# Patient Record
Sex: FEMALE | Race: OTHER
[De-identification: names, ages, dates, MRNs, and addresses within clinical notes are randomized per-mention and may not be internally consistent; named-entity substitution may affect disease eponyms.]

---

## 2019-08-26 ENCOUNTER — HOSPITAL ENCOUNTER (INPATIENT)
Dept: HOSPITAL 54 - GPS | Age: 64
LOS: 3 days | Discharge: LEFT BEFORE DISCHARGE | DRG: 885 | End: 2019-08-29
Attending: INTERNAL MEDICINE | Admitting: PSYCHIATRY & NEUROLOGY
Payer: MEDICARE

## 2019-08-26 VITALS — BODY MASS INDEX: 19.99 KG/M2 | HEIGHT: 65 IN | WEIGHT: 120 LBS

## 2019-08-26 DIAGNOSIS — I10: ICD-10-CM

## 2019-08-26 DIAGNOSIS — E83.51: ICD-10-CM

## 2019-08-26 DIAGNOSIS — M81.0: ICD-10-CM

## 2019-08-26 DIAGNOSIS — T14.91XD: ICD-10-CM

## 2019-08-26 DIAGNOSIS — E04.2: ICD-10-CM

## 2019-08-26 DIAGNOSIS — F29: ICD-10-CM

## 2019-08-26 DIAGNOSIS — F33.2: Primary | ICD-10-CM

## 2019-08-26 NOTE — NUR
ADMITTED THIS 64 YEARS OLD FEMALE FROM E.R PATIENT WAS PLACED ON 5150 HOLD  DUE TO  OVER 
DOSE OF NAR[place], PSYCHOSIS,NOS, UPON FACE TO FACE ASSESSMENT PATIENT DENIES ANY SI/HI AT THIS 
TIME, PATIENT IS UNDER CARE OF DR. MORFIN, AND DUNIA CATALAN. PATIENT SIGN THE CONSENT PAPER 
ADVISEMENT EXPLAIN AND SERVE TO THE PATIENT WITH HOSPITAL POLICY  BOOK AND MEDICATION BOOK. 
BODY ASSESSMENT IS DONE, SKIN IS INTACT AND PATENT NO OPEN WOUND OR BRUISES NOTED, NO 
BEHAVIOUR PROBLEM NOTED AT THIS TIME ALL NEEDS MET WILL CONTINUES TO MONITOR THE PATIENT 
EVERY 15 MINS  FOR SAFETY AND FALL AND ENDORSE TO THE ONCOMING NURSE TO CONTINUES TO MONITOR 
THE PATIENT

## 2019-08-27 VITALS — DIASTOLIC BLOOD PRESSURE: 59 MMHG | SYSTOLIC BLOOD PRESSURE: 92 MMHG

## 2019-08-27 VITALS — SYSTOLIC BLOOD PRESSURE: 100 MMHG | DIASTOLIC BLOOD PRESSURE: 60 MMHG

## 2019-08-27 VITALS — DIASTOLIC BLOOD PRESSURE: 72 MMHG | SYSTOLIC BLOOD PRESSURE: 144 MMHG

## 2019-08-27 LAB
ALBUMIN SERPL BCP-MCNC: 3.7 G/DL (ref 3.4–5)
ALP SERPL-CCNC: 37 U/L (ref 46–116)
ALT SERPL W P-5'-P-CCNC: 20 U/L (ref 12–78)
AST SERPL W P-5'-P-CCNC: 19 U/L (ref 15–37)
BILIRUB SERPL-MCNC: 0.9 MG/DL (ref 0.2–1)
BUN SERPL-MCNC: 13 MG/DL (ref 7–18)
CALCIUM SERPL-MCNC: 8.5 MG/DL (ref 8.5–10.1)
CHLORIDE SERPL-SCNC: 105 MMOL/L (ref 98–107)
CO2 SERPL-SCNC: 27 MMOL/L (ref 21–32)
CREAT SERPL-MCNC: 0.8 MG/DL (ref 0.6–1.3)
GLUCOSE SERPL-MCNC: 92 MG/DL (ref 74–106)
POTASSIUM SERPL-SCNC: 4.5 MMOL/L (ref 3.5–5.1)
PROT SERPL-MCNC: 6.7 G/DL (ref 6.4–8.2)
SODIUM SERPL-SCNC: 140 MMOL/L (ref 136–145)
TSH SERPL DL<=0.005 MIU/L-ACNC: 2.3 UIU/ML (ref 0.36–3.74)

## 2019-08-27 RX ADMIN — VENLAFAXINE HYDROCHLORIDE SCH MG: 37.5 CAPSULE, EXTENDED RELEASE ORAL at 16:00

## 2019-08-27 RX ADMIN — LATANOPROST SCH ML: 50 SOLUTION OPHTHALMIC at 20:54

## 2019-08-27 NOTE — NUR
RN NOTE

CALL MADE TO RELAY RESULT OF US THYROID O ,TOLD WILL LOOK AT THE RESULT.OK TO 
ORDER LATANOPROST EYE DROPS.PATIENT MADE AWARE.

## 2019-08-27 NOTE — NUR
SW called Quin Maldonado ([phone]), the pts , and left a voicemail stating that 
the SW would like to discuss the pts treatment plan and initial discharge plan.

## 2019-08-28 VITALS — DIASTOLIC BLOOD PRESSURE: 69 MMHG | SYSTOLIC BLOOD PRESSURE: 113 MMHG

## 2019-08-28 VITALS — SYSTOLIC BLOOD PRESSURE: 101 MMHG | DIASTOLIC BLOOD PRESSURE: 64 MMHG

## 2019-08-28 VITALS — DIASTOLIC BLOOD PRESSURE: 65 MMHG | SYSTOLIC BLOOD PRESSURE: 100 MMHG

## 2019-08-28 LAB
CHOLEST SERPL-MCNC: 213 MG/DL (ref ?–200)
HDLC SERPL-MCNC: 48 MG/DL (ref 40–60)
LDLC SERPL DIRECT ASSAY-MCNC: 147 MG/DL (ref 0–99)
PTH-INTACT SERPL-MCNC: 25 PG/ML (ref 15–65)
TRIGL SERPL-MCNC: 81 MG/DL (ref 30–150)

## 2019-08-28 RX ADMIN — LATANOPROST SCH ML: 50 SOLUTION OPHTHALMIC at 21:29

## 2019-08-28 RX ADMIN — VENLAFAXINE HYDROCHLORIDE SCH MG: 37.5 CAPSULE, EXTENDED RELEASE ORAL at 10:52

## 2019-08-28 RX ADMIN — CITALOPRAM SCH MG: 20 TABLET ORAL at 12:15

## 2019-08-28 NOTE — NUR
LUC called Quin Maldonado ([phone]), the pts , and left her a voicemail that 
informed her that the pt will be discharged tomorrow and that she will get picked up by her 
son.

## 2019-08-28 NOTE — NUR
SW returned the voicemail of Quin Maldonado ([phone]), the pts , and discussed 
the pts discharge plan. SW discussed the pt returning home with psychiatrist and outpatient 
aftercare referrals. It was discussed that the pts son will not be involved in the treatment 
planning due to the pt not wanting to stress him out due to personal reasons.

## 2019-08-28 NOTE — NUR
Initial Discharge Plan: Pt currently resides at her home located at [address] [place] [address]; ([phone]). Per pt, she would like to return to her home. 
SW will work with the pt and the MD regarding appropriate discharge planning. SW will form a 
safe and proper discharge.

## 2019-08-28 NOTE — NUR
LUC called the pts son, Rene ([phone]), and he stated that he would arrive to  
the pt around 1pm the following day once her hold is up.

## 2019-08-28 NOTE — NUR
LUC conducted a substance abuse intervention for the pt regarding her overdose attempt with 
[place] with one bottle of wine.

## 2019-08-28 NOTE — NUR
Patient refuses venelex because of glaucoma she says she was diagnosed with at the [facility]. For now she refuses her tylenol for headache pain, says she almost 
overdosed on it with the Norco. Chavo HAZEL RN

## 2019-08-29 VITALS — SYSTOLIC BLOOD PRESSURE: 119 MMHG | DIASTOLIC BLOOD PRESSURE: 79 MMHG

## 2019-08-29 RX ADMIN — CITALOPRAM SCH MG: 20 TABLET ORAL at 08:32

## 2019-08-29 NOTE — NUR
discharged patient AMA in stable condition,denied si/hi, picked up by son, accompanied by 
primary nurse. instructed to follow up with primary doctor, and continue prescribed 
medication. dc paperwork and prescription given. all belongings returned and form signed. 
nameband removed. dr pablo aware. supervisor aware.

## 2019-09-13 NOTE — NUR
15 Day Substance Abuse Follow Up: Pt is exempt from this follow up because the pt was 
discharged AMA.